# Patient Record
(demographics unavailable — no encounter records)

---

## 2024-10-07 NOTE — DISCUSSION/SUMMARY
[FreeTextEntry1] : In summary Mr. Borja is a delightful 86-year-old gentleman with a history of coronary artery disease status post PCI to the LAD (noted  of the RCA), severe AS s/p TAVR complicated by complete heart block requiring AV Micra placement (1/14/21), PVD s/p EVAR (2014) hypertension, hyperlipidemia seen in clinic today for evaluation of an upgrade of his device. There has been a decline in his systolic function over the course of the last few years from a normal EF (55-60%) now down to 42%. This is likely due to the fact that he has been chronically RV paced since the TAVR. He would likely benefit from CRT-P therapy. After discussion with the patient, reviewing the pathophysiology, procedure, and risks, Mr. Borja was amenable for upgrade to a CRT-P device.  -Will schedule for CRT-P upgrade -Continue current medication regimen  The PPM interrogation and ECG were distinct procedures   I, Dr. Arshad, personally performed the evaluation and management (E/M) services for this new patient. That E/M includes conducting the initial examination, assessing all conditions, and establishing the plan of care. Today, my fellow, Darryl Cummings, was here to observe my evaluation and management services for this patient to be followed going forward. [EKG obtained to assist in diagnosis and management of assessed problem(s)] : EKG obtained to assist in diagnosis and management of assessed problem(s)

## 2024-10-07 NOTE — HISTORY OF PRESENT ILLNESS
[FreeTextEntry1] : I had the pleasure of seeing you patient Edward Borja today in the arrhythmia clinic of [facility]. As you well know, patient is a delightful 86-year-old gentleman with a history of coronary artery disease status post PCI to the LAD (noted  of the RCA), severe AS s/p TAVR complicated by complete heart block requiring AV Micra placement (1/14/21), PVD s/p EVAR (2014), hypertension, hyperlipidemia seen in clinic today for evaluation of an upgrade of his device.  Mr. Borja had symptomatic severe aortic stenosis, and underwent a TAVR in January of 2021, which was complicated by complete heart block requiring AV Micra placement. Since then, he has reported some shortness of breath, but remains active and has denied any syncope, near syncope, palpitations. Over the course of the last few years, he has noted to have a decrease in his left ventricular function, previously with an EF of 55-60%, most recent echocardiogram has shown a reduced EF to 42%. His device was functioning normally, with adequate sensing and pacing thresholds, noting AM- 88.8% of the time, and  only 9.8% of the time. Given his reduced function, and being chronically RV paced, there was thought that this may be contributing to his LV dysfunction. He was subsequently referred to the arrhythmia clinic for a possible upgrade to a CRT-P system.  While in clinic today, Mr. Borja was in good spirits. He reported dyspnea on exertion after walking about 1 block, which has been about the same since his catheterization and TAVR in 2021. Does not report any edema, chest pain, palpitations, pre-syncope or syncopal episodes. His blood pressure was 151/78, with a heart rate of 66 BPM and regular. On ECG, showed sinus rhythm with heart block and RV pacing at 68 BPM. Upon device interrogation, his device is functioning normally with a battery life of >8.0 years. RV impedance was 690 ohms, with a threshold of 0.38V @ 0.24msec, and a measured R wave of >20mV.

## 2024-10-07 NOTE — PHYSICAL EXAM
[Well Developed] : well developed [Well Nourished] : well nourished [No Acute Distress] : no acute distress [Normal S1, S2] : normal S1, S2 [No Murmur] : no murmur [No Rub] : no rub [No Gallop] : no gallop [Clear Lung Fields] : clear lung fields [Good Air Entry] : good air entry [No Respiratory Distress] : no respiratory distress  [Soft] : abdomen soft [No Edema] : no edema [No Cyanosis] : no cyanosis [No Clubbing] : no clubbing [Alert and Oriented] : alert and oriented

## 2024-10-25 NOTE — PHYSICAL EXAM

## 2024-10-25 NOTE — HISTORY OF PRESENT ILLNESS
[FreeTextEntry1] : 86 year old man with AS, s/p TAVR.  CAD s/o stent. He required a Micra post TAVR and now being considered for CRT due to decreased LV function from RV pacing. He has mild CORADO.  he does not have chest discomfort or syncope.

## 2024-10-25 NOTE — DISCUSSION/SUMMARY
[FreeTextEntry1] : Patient stable s/p TAVR and coronary stent.  awaiting schedulling for upgrade of his Micra to correct LV dysfunction from RV pacing. [EKG obtained to assist in diagnosis and management of assessed problem(s)] : EKG obtained to assist in diagnosis and management of assessed problem(s)

## 2024-10-25 NOTE — PHYSICAL EXAM

## 2024-12-19 NOTE — PHYSICAL EXAM
[2+] : left 2+ [Alert] : alert [Oriented to Person] : oriented to person [Oriented to Place] : oriented to place [Oriented to Time] : oriented to time [Calm] : calm [de-identified] : well appearing, NAD [de-identified] : unlabored  [de-identified] : soft, NT

## 2024-12-19 NOTE — PHYSICAL EXAM
[2+] : left 2+ [Alert] : alert [Oriented to Person] : oriented to person [Oriented to Place] : oriented to place [Oriented to Time] : oriented to time [Calm] : calm [de-identified] : well appearing, NAD [de-identified] : unlabored  [de-identified] : soft, NT

## 2024-12-19 NOTE — HISTORY OF PRESENT ILLNESS
[FreeTextEntry1] :  MARGOT ALMAZAN (: 1938) is a 85 year old male who presents today for annual surveillance of AAA.   He is s/p EVAR in .   No new complaints today.  Feels well and better since PPM replaced 2024.  Denies pain in the chest/abdomen/pelvis.  Has chronic LBP with radiation down the legs. Is able to walk 3 blocks comfortably. Denies calf claudication, rest pain and wounds.   PMH: AV stenosis s/p TAVR (), MI (), cardiac stent (), PPM (), HTN, AAA s/p EVAR in , R NORM balloon angioplasty, BPH, ex-smoker  2024  Aortoiliac duplex with patent EVAR. No endoleak. Residual sac 3.8 cm. >50% stenosis of bilateral EIA.   Previous studies: 2023 Aortoiliac duplex with patent EVAR. No endoleak. Residual sac 3.8 cm. >50% stenosis of bilateral EIA.  2022 U/S demonstrates a patent EVAR with 3.6 cm residual sac (3.7 cm in 2022). Bilateral EIA noted with elevated but stable velocities. 2021 - Aortoiliac ultrasound shows patent EVAR w/o no endoleak and stable residual sac at 4.1 cm. Elevated velocities at R EIA (293 cm/s) and L EIA (308 cm/s). 2021- CATARINO 1 bilaterally 2018 - Outside carotid duplex with 16-49% stenosis of bilateral ICA

## 2025-06-26 NOTE — PHYSICAL EXAM

## 2025-06-26 NOTE — HISTORY OF PRESENT ILLNESS
TCN following. HTH/SCP chart reviewed. No new TCN needs identified. Noted patient remains ambulatory without AD or assist and on RA. Please see prior TCN note from 6/14 for most recent discharge planning considerations if indicated.     Completed:  Choice obtained: none indicated currently; see above  SCP with [facility] PCP. PCP f/u scheduled for 6/21      [FreeTextEntry1] : 87 year old man with AS, S/P TAVR, CAD, s/p LAD stent, CRT for mild LV dysfunction.  He has developed increasing CORADO.  No edema, orthopnea.  Previously abnormal stress test.

## 2025-06-26 NOTE — DISCUSSION/SUMMARY
[EKG obtained to assist in diagnosis and management of assessed problem(s)] : EKG obtained to assist in diagnosis and management of assessed problem(s) [FreeTextEntry1] : Increasing CORADO.  Echo 2/2025 showed good valvular function and LV function unchanged.  Possible ischemia.  Stress tests have been falsely positive in the past.  Coronary CTA ordered.

## 2025-06-27 NOTE — DISCUSSION/SUMMARY
[FreeTextEntry1] : Increasing CORADO.  Echo 2/2025 showed good valvular function and LV function unchanged.  Possible ischemia.  Stress tests have been falsely positive in the past.  Coronary CTA ordered. [EKG obtained to assist in diagnosis and management of assessed problem(s)] : EKG obtained to assist in diagnosis and management of assessed problem(s)

## 2025-06-27 NOTE — HISTORY OF PRESENT ILLNESS
[FreeTextEntry1] : 87 year old man with AS, S/P TAVR, CAD, s/p LAD stent, CRT for mild LV dysfunction.  He has developed increasing CORADO.  No edema, orthopnea.  Previously abnormal stress test.